# Patient Record
Sex: MALE | Race: WHITE | Employment: UNEMPLOYED | URBAN - METROPOLITAN AREA
[De-identification: names, ages, dates, MRNs, and addresses within clinical notes are randomized per-mention and may not be internally consistent; named-entity substitution may affect disease eponyms.]

---

## 2017-03-10 ENCOUNTER — OFFICE VISIT (OUTPATIENT)
Dept: CARDIOLOGY CLINIC | Age: 62
End: 2017-03-10

## 2017-03-10 VITALS
HEIGHT: 68 IN | HEART RATE: 115 BPM | WEIGHT: 169 LBS | SYSTOLIC BLOOD PRESSURE: 130 MMHG | DIASTOLIC BLOOD PRESSURE: 82 MMHG | BODY MASS INDEX: 25.61 KG/M2

## 2017-03-10 DIAGNOSIS — R42 ORTHOSTATIC DIZZINESS: Primary | ICD-10-CM

## 2017-03-10 DIAGNOSIS — I95.1 ORTHOSTATIC HYPOTENSION: ICD-10-CM

## 2017-03-10 DIAGNOSIS — F20.0 PARANOID SCHIZOPHRENIA (HCC): ICD-10-CM

## 2017-03-10 NOTE — PROGRESS NOTES
Chief Complaint   Patient presents with    Dizziness    Follow-up     1 month follow up     Verified patient with two types of identifiers.    Verified medications with patients medications list.

## 2017-03-10 NOTE — MR AVS SNAPSHOT
Visit Information Date & Time Provider Department Dept. Phone Encounter #  
 3/10/2017 11:40 AM Vanessa Vergara MD [facility] Tamika Thomas [phone] [id number] Follow-up Instructions Return in about 6 months (around 9/10/2017). Your Appointments 9/14/2017  2:00 PM  
ESTABLISHED PATIENT with Vanessa Vergara MD  
[facility] ([address]) Appt Note: 6 month follow up per Dr. Xochilt Morin [address]  
[address] [facility] 7 [address] Upcoming Health Maintenance Date Due Hepatitis C Screening 1955 DTaP/Tdap/Td series (1 - Tdap) 3/4/1975 FOBT Q 1 YEAR AGE 50-75 3/4/2004 ZOSTER VACCINE AGE 60> 3/4/2014 INFLUENZA AGE 9 TO ADULT 8/1/2016 Allergies as of 3/10/2017  Review Complete On: 3/10/2017 By: Vanessa Vergara MD  
 No Known Allergies Current Immunizations  Reviewed on 10/18/2016 No immunizations on file. Not reviewed this visit You Were Diagnosed With   
  
 Codes Comments Orthostatic dizziness    -  Primary ICD-10-CM: A14 ICD-9-CM: 780.4 Orthostatic hypotension     ICD-10-CM: I95.1 ICD-9-CM: 458.0 Paranoid schizophrenia ([facility] [address]     ICD-10-CM: F20.0 ICD-9-CM: 295.30 Vitals BP Pulse Height(growth percentile) Weight(growth percentile) BMI Smoking Status 130/82 (BP 1 Location: Right arm, BP Patient Position: Sitting) (!) 115 5' 8\" (1.727 m) 169 lb (76.7 kg) 25.7 kg/m2 Never Smoker Vitals History BMI and BSA Data Body Mass Index Body Surface Area 25.7 kg/m 2 1.92 m 2 Preferred Pharmacy Pharmacy Name Phone [address] [phone] Your Updated Medication List  
  
   
This list is accurate as of: 3/10/17 12:10 PM.  Always use your most recent med list.  
  
  
  
  
 clonazePAM 2 mg tablet Commonly known as:  Luisa Cos Take 2 mg by mouth nightly. cloZAPine 25 mg tablet Commonly known as:  CLOZARIL Take 75 mg by mouth daily. fludrocortisone 0.1 mg tablet Commonly known as:  FLORINEF Take 1 Tab by mouth daily. Indications: Symptomatic Orthostatic Hypotension  
  
 fluvoxaMINE 50 mg tablet Commonly known as:  Nandini Dubs Take 50 mg by mouth nightly.  
  
 gabapentin 100 mg capsule Commonly known as:  NEURONTIN Take 100 mg by mouth as directed. 2 po bid VOLTAREN PO Take 75 mg by mouth. Follow-up Instructions Return in about 6 months (around 9/10/2017). Patient Instructions Follow up with Dr. Gordon Gaston in 6 months. Introducing [facility]! Ashish Jacobson introduces Streamup patient portal. Now you can access parts of your medical record, email your doctor's office, and request medication refills online. 1. In your internet browser, go to [email or web address] 2. Click on the First Time User? Click Here link in the Sign In box. You will see the New Member Sign Up page. 3. Enter your Streamup Access Code exactly as it appears below. You will not need to use this code after youve completed the sign-up process. If you do not sign up before the expiration date, you must request a new code. · Streamup Access Code: [email or web address] Expires: 6/8/2017 12:09 PM 
 
4. Enter the last four digits of your Social Security Number (xxxx) and Date of Birth (mm/dd/yyyy) as indicated and click Submit. You will be taken to the next sign-up page. 5. Create a Streamup ID. This will be your Streamup login ID and cannot be changed, so think of one that is secure and easy to remember. 6. Create a Streamup password. You can change your password at any time. 7. Enter your Password Reset Question and Answer. This can be used at a later time if you forget your password. 8. Enter your e-mail address.  You will receive e-mail notification when new information is available in [employer]. 9. Click Sign Up. You can now view and download portions of your medical record. 10. Click the Download Summary menu link to download a portable copy of your medical information. If you have questions, please visit the Frequently Asked Questions section of the [employer] website. Remember, [employer] is NOT to be used for urgent needs. For medical emergencies, dial 911. Now available from your iPhone and Android! Please provide this summary of care documentation to your next provider. Your primary care clinician is listed as Marimar Buenrostro. If you have any questions after today's visit, please call [phone].

## 2017-03-10 NOTE — PROGRESS NOTES
Cardiac Electrophysiology Office  Note     Subjective:      Poonam Holder is a 60 y.o. male patient who is seen for evaluation of orthostatic hypotension/dizziness    He is kindly referred by Dr Noah Lan. Echo 10/12/16 LVEF 55 %. No RWMA. His episodes of dizziness in the past.  He got up to go the bathroom in the middle of the night, he felt lightheaded/dizzy and had to grab on the sink and went back to bed. He then woke up dizzy. No LOC. He has had episode dizziness a couple nights before when rolling over in bed. Since then he tried antivert and said he helped some  He has been on antipsychotic for 25 years and it helped his psychosis     He is here today for follow up, he was started on florinef last visit. Since then he has not had any further episodes of dizziness. No hospitalizations or ED visit. He denies SOB or chest pain. His reports his heart is beating fast because he is very nervous when he is at the doctors. He says he cannot drink w/ caffeine because he has PVCs/SOB. Social Hx: Denies tobacco use. 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 Single. Live alone. He does not drive. He is on disability. PMhx includes schizophrena    Patient Active Problem List    Diagnosis Date Noted    Orthostatic hypotension 10/18/2016    Orthostatic dizziness 10/07/2016    Arthralgia of both knees 07/28/2015    Dry eyes 01/27/2015    AR (allergic rhinitis) 01/27/2015    Finger numbness 09/02/2014    Constipation 11/22/2010    [facility] 11/22/2010     Current Outpatient Prescriptions   Medication Sig Dispense Refill    cloZAPine (CLOZARIL) 25 mg tablet Take 75 mg by mouth daily.  fludrocortisone (FLORINEF) 0.1 mg tablet Take 1 Tab by mouth daily. Indications: Symptomatic Orthostatic Hypotension 30 Tab 12    DICLOFENAC SODIUM (VOLTAREN PO) Take 75 mg by mouth.  fluvoxaMINE (LUVOX) 50 mg tablet Take 50 mg by mouth nightly.  clonazepam (KLONOPIN) 2 mg tablet Take 2 mg by mouth nightly.  gabapentin (NEURONTIN) 100 mg capsule Take 100 mg by mouth as directed. 2 po bid        No Known Allergies  Past Medical History:   Diagnosis Date    Psychotic disorder      No past surgical history    Family History   Problem Relation Age of Onset    No Known Problems Mother     No Known Problems Father      Social History   Substance Use Topics    Smoking status: Never Smoker    Smokeless tobacco: Never Used    Alcohol use No        Review of Systems:   Constitutional: Negative for fever, chills, weight loss, malaise/fatigue. HEENT: Negative for nosebleeds, vision changes. Respiratory: Negative for cough, hemoptysis, sputum production, and wheezing. Cardiovascular: Negative for chest pain, palpitations, orthopnea, claudication, leg swelling, syncope, and PND. Gastrointestinal: Negative for nausea, vomiting, diarrhea, constipation, blood in stool and melena. Genitourinary: Negative for dysuria, and hematuria. Musculoskeletal: Negative for myalgias, arthralgia. Skin: Negative for rash. Heme: Does not bleed or bruise easily. Neurological: Negative for speech change and focal weakness     Objective:     Visit Vitals    /82 (BP 1 Location: Right arm, BP Patient Position: Sitting)    Pulse (!) 115    Ht 5' 8\" (1.727 m)    Wt 169 lb (76.7 kg)    BMI 25.7 kg/m2        Physical Exam:   Constitutional: Well-nourished. No distress. Head: Normocephalic and atraumatic. Eyes: Pupils are equal, round  Neck: supple. No JVD present. Cardiovascular: tachycardic rate, regular rhythm. Exam reveals no gallop and no friction rub. No murmur heard. Pulmonary/Chest: Effort normal and breath sounds normal. No wheezes. Abdominal: Soft, no tenderness. Musculoskeletal: no edema. Neurological: alert,oriented. Skin: Skin is warm and dry  Psychiatric: normal mood and affect.  Behavior is normal. Judgment and thought content normal.      EKG, old: normal sinus rhythm normal intervals    Assessment/Plan:       ICD-10-CM ICD-9-CM    1. Orthostatic dizziness R42 780.4    2. Orthostatic hypotension I95.1 458.0    3. Paranoid schizophrenia ([facility] [address] F20.0 295.30        BP stable with florinef, no further episodes of dizziness. Encouraged him to continue drinking water throughout the day. HR elevated in the office reported d/t anxiety, will continue to monitor. Follow-up Disposition:  Return in about 6 months (around 9/10/2017). to see if better and if more testing or treatment needed      Thank you for involving me in this patient's care and please call with further concerns or questions. Bronwyn Peralta M.D.   Electrophysiology/Cardiology  [facility] [place]  [address], [facility] [address] [place] [address]  [phone]

## 2017-03-10 NOTE — PROGRESS NOTES
Cardiac Electrophysiology Office Note     Subjective:      Misty Dotson is a 60 y.o. male patient who is seen for evaluation of orthostatic hypotension/dizziness    He is kindly referred by Dr Sherri Newman. Echo 10/12/16 LVEF 55 %. No RWMA. His episodes of dizziness in the past.  He got up to go the bathroom in the middle of the night, he felt lightheaded/dizzy and had to grab on the sink and went back to bed. He then woke up dizzy. No LOC. He has had episode dizziness a couple nights before when rolling over in bed. Since then he tried antivert and said he helped some  He has been on antipsychotic for 25 years and it helped his psychosis      He is here today for follow up, he was started on florinef last visit. Since then he has not had any further episodes of dizziness. No hospitalizations or ED visit. He denies SOB or chest pain. His reports his heart is beating fast because he is very nervous when he is at the doctor's. He says he cannot drink w/ caffeine because he has PVCs/SOB. Social Hx: Denies tobacco use. 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 Single. Live alone. He does not drive. He is on disability. PMhx includes schizophrena         Patient Active Problem List    Diagnosis Date Noted    Orthostatic hypotension 10/18/2016    Orthostatic dizziness 10/07/2016    Arthralgia of both knees 07/28/2015    Dry eyes 01/27/2015    AR (allergic rhinitis) 01/27/2015    Finger numbness 09/02/2014    Constipation 11/22/2010    [facility] 11/22/2010     Current Outpatient Prescriptions   Medication Sig Dispense Refill    cloZAPine (CLOZARIL) 25 mg tablet Take 75 mg by mouth daily.  fludrocortisone (FLORINEF) 0.1 mg tablet Take 1 Tab by mouth daily. Indications: Symptomatic Orthostatic Hypotension 30 Tab 12    DICLOFENAC SODIUM (VOLTAREN PO) Take 75 mg by mouth.  fluvoxaMINE (LUVOX) 50 mg tablet Take 50 mg by mouth nightly.       clonazepam (KLONOPIN) 2 mg tablet Take 2 mg by mouth nightly.  gabapentin (NEURONTIN) 100 mg capsule Take 100 mg by mouth as directed. 2 po bid        No Known Allergies  Past Medical History:   Diagnosis Date    Psychotic disorder      No past surgical history    Family History   Problem Relation Age of Onset    No Known Problems Mother     No Known Problems Father      Social History   Substance Use Topics    Smoking status: Never Smoker    Smokeless tobacco: Never Used    Alcohol use No        Review of Systems:   Constitutional: Negative for fever, chills, weight loss, malaise/fatigue. HEENT: Negative for nosebleeds, vision changes. Respiratory: Negative for cough, hemoptysis, sputum production, and wheezing. Cardiovascular: Negative for chest pain, palpitations, orthopnea, claudication, leg swelling, syncope, and PND. Gastrointestinal: Negative for nausea, vomiting, diarrhea, constipation, blood in stool and melena. Genitourinary: Negative for dysuria, and hematuria. Musculoskeletal: Negative for myalgias, arthralgia. Skin: Negative for rash. Heme: Does not bleed or bruise easily. Neurological: Negative for speech change and focal weakness     Objective:     Visit Vitals    /82 (BP 1 Location: Right arm, BP Patient Position: Sitting)    Pulse (!) 115    Ht 5' 8\" (1.727 m)    Wt 169 lb (76.7 kg)    BMI 25.7 kg/m2        Physical Exam:   Constitutional: Well-nourished. No distress. Head: Normocephalic and atraumatic. Eyes: Pupils are equal, round  Neck: supple. No JVD present. Cardiovascular: tachycardic rate, regular rhythm. Exam reveals no gallop and no friction rub. No murmur heard. Pulmonary/Chest: Effort normal and breath sounds normal. No wheezes. Abdominal: Soft, no tenderness. Musculoskeletal: no edema. Neurological: alert,oriented. Skin: Skin is warm and dry  Psychiatric: normal mood and affect.  Behavior is normal. Judgment and thought content normal.           Assessment/Plan:       ICD-10-CM ICD-9-CM    1. Orthostatic dizziness R42 780.4    2. Orthostatic hypotension I95.1 458.0    3. Paranoid schizophrenia ([facility] [address] F20.0 295.30        BP is better with florinef, no further episodes of dizziness since it was started. Encouraged him to continue drinking water  64 oz a day  May have salty foods  Sinus tachycardia in the office due to anxiety   Continue with florinef and call me if he has recurrent problem or sustained sinus tach    Follow-up Disposition:  Return in about 6 months (around 9/10/2017). Thank you for involving me in this patient's care and please call with further concerns or questions. Joana Celis M.D.   Electrophysiology/Cardiology  [facility] [place]  [facility], [address]  [phone]

## 2017-05-01 ENCOUNTER — OFFICE VISIT (OUTPATIENT)
Dept: INTERNAL MEDICINE CLINIC | Age: 62
End: 2017-05-01

## 2017-05-01 VITALS
HEART RATE: 114 BPM | TEMPERATURE: 98.2 F | RESPIRATION RATE: 22 BRPM | OXYGEN SATURATION: 95 % | DIASTOLIC BLOOD PRESSURE: 79 MMHG | HEIGHT: 68 IN | WEIGHT: 167 LBS | SYSTOLIC BLOOD PRESSURE: 110 MMHG | BODY MASS INDEX: 25.31 KG/M2

## 2017-05-01 DIAGNOSIS — M25.562 ARTHRALGIA OF BOTH KNEES: ICD-10-CM

## 2017-05-01 DIAGNOSIS — I95.1 ORTHOSTATIC HYPOTENSION: Primary | ICD-10-CM

## 2017-05-01 DIAGNOSIS — M25.561 ARTHRALGIA OF BOTH KNEES: ICD-10-CM

## 2017-05-01 DIAGNOSIS — F20.0 PARANOID SCHIZOPHRENIA (HCC): ICD-10-CM

## 2017-05-01 NOTE — MR AVS SNAPSHOT
Visit Information Date & Time Provider Department Dept. Phone Encounter #  
 5/1/2017  1:30 PM Louise Viveros [facility] Internal Medicine [phone] [id number] Follow-up Instructions Return in about 6 months (around 11/1/2017). Your Appointments 9/14/2017  2:00 PM  
ESTABLISHED PATIENT with Ryan Shelby MD  
[facility] ([facility]) Appt Note: 6 month follow up per Dr. Barker Sizer [address]  
[address] [facility] [address] Upcoming Health Maintenance Date Due Hepatitis C Screening 1955 DTaP/Tdap/Td series (1 - Tdap) 3/4/1975 FOBT Q 1 YEAR AGE 50-75 3/4/2004 ZOSTER VACCINE AGE 60> 3/4/2014 INFLUENZA AGE 9 TO ADULT 8/1/2017 Allergies as of 5/1/2017  Review Complete On: 5/1/2017 By: Queenie Us, DO No Known Allergies Current Immunizations  Reviewed on 10/18/2016 No immunizations on file. Not reviewed this visit You Were Diagnosed With   
  
 Codes Comments Orthostatic hypotension    -  Primary ICD-10-CM: I95.1 ICD-9-CM: 458.0 Arthralgia of both knees     ICD-10-CM: M25.561, M25.562 ICD-9-CM: 719.46 [facility]an[facility] ([facility]     ICD-10-CM: F20.0 ICD-9-CM: 295.30 Vitals BP Pulse Temp Resp Height(growth percentile) Weight(growth percentile) 110/79 (!) 114 98.2 °F (36.8 °C) (Oral) 22 5' 8\" (1.727 m) 167 lb (75.8 kg) SpO2 BMI Smoking Status 95% 25.39 kg/m2 Never Smoker BMI and BSA Data Body Mass Index Body Surface Area  
 25.39 kg/m 2 1.91 m 2 Preferred Pharmacy Pharmacy Name Phone [address] [phone] Your Updated Medication List  
  
   
This list is accurate as of: 5/1/17  1:59 PM.  Always use your most recent med list.  
  
  
  
  
 clonazePAM 2 mg tablet Commonly known as:  Shannan Willisaham Take 2 mg by mouth nightly. cloZAPine 25 mg tablet Commonly known as:  CLOZARIL Take 75 mg by mouth daily. fludrocortisone 0.1 mg tablet Commonly known as:  FLORINEF Take 1 Tab by mouth daily. Indications: Symptomatic Orthostatic Hypotension  
  
 fluvoxaMINE 50 mg tablet Commonly known as:  Genet Potter Take 50 mg by mouth nightly.  
  
 gabapentin 100 mg capsule Commonly known as:  NEURONTIN Take 100 mg by mouth as directed. 2 po bid VOLTAREN PO Take 75 mg by mouth. Follow-up Instructions Return in about 6 months (around 11/1/2017). Please provide this summary of care documentation to your next provider. Your primary care clinician is listed as Phoebe Dalal. If you have any questions after today's visit, please call [phone].

## 2017-05-01 NOTE — PROGRESS NOTES
Chief Complaint   Patient presents with    Neck Pain     pain scale 4/10      Reviewed record  In preparation for visit and have obtained necessary documentation. 1. Have you been to the ER, urgent care clinic since your last visit? Hospitalized since your last visit? No    2. Have you seen or consulted any other health care providers outside of the [address] [place] [address] since your last visit? Include any pap smears or colon screening.  No      Used 2 patient I. D. 's.

## 2017-05-23 NOTE — PROGRESS NOTES
HISTORY OF PRESENT ILLNESS  Naz Yip is a 60 y.o. male. Pt. comes in after a few months for f/u. Has multiple medical problems. Reports a few weeks of neck pain. No trauma. Has had similar issues in past. No focal neurological issues. Also has chronic right shoulder and bilat knee pain. Followed by psychiatrist for schizophrenia. Has anxiety. His hypotension has been stable. Reports compliance with medications and diet. Med list and most recent labs/studies reviewed with pt. Trying to be active physically to control weight. Reports no other new c/o. Neck Pain   Associated symptoms include headaches. Pertinent negatives include no chest pain, no abdominal pain and no shortness of breath. Follow Up Chronic Condition   Associated symptoms include headaches. Pertinent negatives include no chest pain, no abdominal pain and no shortness of breath. Hypotension   Associated symptoms include headaches. Pertinent negatives include no chest pain, no abdominal pain and no shortness of breath. Review of Systems   Constitutional: Negative. Eyes: Negative for blurred vision. Respiratory: Negative for shortness of breath. Cardiovascular: Negative for chest pain and leg swelling. Gastrointestinal: Negative for abdominal pain. Genitourinary: Negative for dysuria. Musculoskeletal: Positive for joint pain and neck pain. Negative for back pain and falls. Skin: Negative for rash. Neurological: Positive for headaches. Negative for dizziness, sensory change and focal weakness. Psychiatric/Behavioral: Positive for depression and hallucinations. The patient is not nervous/anxious and does not have insomnia. All other systems reviewed and are negative. Physical Exam   Constitutional: He is oriented to person, place, and time. He appears well-developed and well-nourished. No distress. HENT:   Head: Normocephalic and atraumatic.    Mouth/Throat: Oropharynx is clear and moist.   Eyes: Conjunctivae are normal. No scleral icterus. Neck: Normal range of motion. Neck supple. No JVD present. No thyromegaly present. Cardiovascular: Normal rate, regular rhythm, normal heart sounds and intact distal pulses. No murmur heard. Pulmonary/Chest: Effort normal and breath sounds normal. No respiratory distress. He has no wheezes. He has no rales. Abdominal: Soft. Bowel sounds are normal. He exhibits no distension. Musculoskeletal: He exhibits tenderness (R cervicals/ trapezius muscle). He exhibits no edema. Neurological: He is alert and oriented to person, place, and time. He has normal reflexes. No cranial nerve deficit. Coordination normal.   Skin: Skin is warm and dry. No rash noted. Psychiatric: He has a normal mood and affect. His behavior is normal.   Nursing note and vitals reviewed. ASSESSMENT and PLAN  O[place] Signs was seen today for neck pain, follow up chronic condition and hypotension. Diagnoses and all orders for this visit:    Neck pain    Orthostatic hypotension    Arthralgia of both knees    Paranoid schizophrenia ([facility] [address]      Follow-up Disposition:  Return in about 6 months (around 11/1/2017).    lab results and schedule of future lab studies reviewed with patient  reviewed diet, exercise and weight control  reviewed medications and side effects in detail  Apply heat to area  ROM exercoses  Reassurance  F/u with other MD's as scheduled

## 2017-09-14 ENCOUNTER — OFFICE VISIT (OUTPATIENT)
Dept: CARDIOLOGY CLINIC | Age: 62
End: 2017-09-14

## 2017-09-14 VITALS
SYSTOLIC BLOOD PRESSURE: 120 MMHG | WEIGHT: 167.2 LBS | HEIGHT: 68 IN | HEART RATE: 96 BPM | BODY MASS INDEX: 25.34 KG/M2 | OXYGEN SATURATION: 99 % | DIASTOLIC BLOOD PRESSURE: 82 MMHG

## 2017-09-14 DIAGNOSIS — F20.0 PARANOID SCHIZOPHRENIA (HCC): ICD-10-CM

## 2017-09-14 DIAGNOSIS — I95.1 ORTHOSTATIC HYPOTENSION: ICD-10-CM

## 2017-09-14 DIAGNOSIS — R42 ORTHOSTATIC DIZZINESS: Primary | ICD-10-CM

## 2017-09-14 NOTE — PROGRESS NOTES
Cardiac Electrophysiology Office Note     Subjective:      Jose Paredes is a 60 y.o. male patient who is seen for evaluation of orthostatic hypotension/dizziness    He is kindly referred by Dr Sabine Huang. Echo 10/12/16 LVEF 55 %. No RWMA. He has not had syncope or dizziness but twice has accidentally hit his head but no bleeding     In the past:  He got up to go the bathroom in the middle of the night, he felt lightheaded/dizzy and had to grab on the sink and went back to bed. He then woke up dizzy. No LOC. He has had episode dizziness a couple nights before when rolling over in bed. Since then he tried antivert and said he helped some  He has been on antipsychotic for 25 years and it helped his psychosis     Social Hx: Denies tobacco use. 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 Single. Live alone. He does not drive. He is on disability. PMhx includes schizophrena         Patient Active Problem List    Diagnosis Date Noted    Orthostatic hypotension 10/18/2016    Orthostatic dizziness 10/07/2016    Arthralgia of both knees 07/28/2015    Dry eyes 01/27/2015    AR (allergic rhinitis) 01/27/2015    Finger numbness 09/02/2014    Constipation 11/22/2010    Schizophrenia ([facility] [address] [facility] 11/22/2010     Current Outpatient Prescriptions   Medication Sig Dispense Refill    cloZAPine (CLOZARIL) 25 mg tablet Take 75 mg by mouth daily.  fludrocortisone (FLORINEF) 0.1 mg tablet Take 1 Tab by mouth daily. Indications: Symptomatic Orthostatic Hypotension 30 Tab 12    DICLOFENAC SODIUM (VOLTAREN PO) Take 75 mg by mouth.  fluvoxaMINE (LUVOX) 50 mg tablet Take 50 mg by mouth nightly.  clonazepam (KLONOPIN) 2 mg tablet Take 2 mg by mouth nightly.  gabapentin (NEURONTIN) 100 mg capsule Take 100 mg by mouth as directed.  2 po bid        No Known Allergies  Past Medical History:   Diagnosis Date    Psychotic disorder      No past surgical history    Family History   Problem Relation Age of Onset    No Known Problems Mother  No Known Problems Father      Social History   Substance Use Topics    Smoking status: Never Smoker    Smokeless tobacco: Never Used    Alcohol use No        Review of Systems:   Constitutional: Negative for fever, chills, weight loss, malaise/fatigue. HEENT: Negative for nosebleeds, vision changes. Respiratory: Negative for cough, hemoptysis, sputum production, and wheezing. Cardiovascular: Negative for chest pain, palpitations, orthopnea, claudication, leg swelling, syncope, and PND. Gastrointestinal: Negative for nausea, vomiting, diarrhea, constipation, blood in stool and melena. Genitourinary: Negative for dysuria, and hematuria. Musculoskeletal: Negative for myalgias, arthralgia. Skin: Negative for rash. Heme: Does not bleed or bruise easily. Neurological: Negative for speech change and focal weakness     Objective:     Visit Vitals    /82 (BP 1 Location: Left arm, BP Patient Position: Sitting)    Pulse 96    Ht 5' 8\" (1.727 m)    Wt 167 lb 3.2 oz (75.8 kg)    SpO2 99%    BMI 25.42 kg/m2        Physical Exam:   Constitutional: Well-nourished. No distress. Head: Normocephalic and atraumatic. Eyes: Pupils are equal, round  Neck: supple. No JVD present. Cardiovascular: normal rate, regular rhythm. Exam reveals no gallop and no friction rub. No murmur heard. Pulmonary/Chest: Effort normal and breath sounds normal. No wheezes. Abdominal: Soft, no tenderness. Musculoskeletal: no edema. Neurological: alert,oriented. Skin: Skin is warm and dry  Psychiatric: normal mood and affect. Behavior is normal. Judgment and thought content normal.           Assessment/Plan:       ICD-10-CM ICD-9-CM    1. Orthostatic dizziness R42 780.4    2. Orthostatic hypotension I95.1 458.0    3.  Paran[facility] ([facility] F20.0 295.30      BP is better with florinef, no further episodes of dizziness or syncope  Encouraged him to continue drinking water 64 oz a day  Continue with florinef and call me if he has recurrent problem or sustained sinus tach    Follow-up Disposition:  Return in about 1 year (around 9/14/2018). Thank you for involving me in this patient's care and please call with further concerns or questions. Moraima Thurman M.D.   Electrophysiology/Cardiology  [facility] [place]  [facility] [address]  [phone]

## 2017-09-14 NOTE — MR AVS SNAPSHOT
Visit Information Date & Time Provider Department Dept. Phone Encounter #  
 9/14/2017  2:00 PM Dinorah Chaudhry MD [facility] [place] [phone] [id number] Follow-up Instructions Return in about 1 year (around 9/14/2018). Follow-up and Disposition History Your Appointments 10/30/2017  2:30 PM  
[facility] Appt Note: 6 month follow up [address] [place]  
[phone]  
  
   
 [address]  
  
    
 9/13/2018  1:00 PM  
ESTABLISHED PATIENT with Dinorah Chaudhry MD  
[facility] ([facility]) Appt Note: 1 year fu  
 [address] [place] [address]  
[address] [facility] [address] Upcoming Health Maintenance Date Due Hepatitis C Screening 1955 DTaP/Tdap/Td series (1 - Tdap) 3/4/1975 FOBT Q 1 YEAR AGE 50-75 3/4/2004 ZOSTER VACCINE AGE 60> 1/4/2014 INFLUENZA AGE 9 TO ADULT 8/1/2017 Allergies as of 9/14/2017  Review Complete On: 9/14/2017 By: Dinorah Chaudhry MD  
 No Known Allergies Current Immunizations  Reviewed on 10/18/2016 No immunizations on file. Not reviewed this visit You Were Diagnosed With   
  
 Codes Comments Orthostatic dizziness    -  Primary ICD-10-CM: K93 ICD-9-CM: 780.4 Orthostatic hypotension     ICD-10-CM: I95.1 ICD-9-CM: 458.0 Paranoid schizophrenia ([facility] [address]     ICD-10-CM: F20.0 ICD-9-CM: 295.30 Vitals BP Pulse Height(growth percentile) Weight(growth percentile) SpO2 BMI  
 120/82 (BP 1 Location: Left arm, BP Patient Position: Sitting) 96 5' 8\" (1.727 m) 167 lb 3.2 oz (75.8 kg) 99% 25.42 kg/m2 Smoking Status Never Smoker Vitals History BMI and BSA Data  Body Mass Index Body Surface Area  
 25.42 kg/m 2 1.91 m 2  
 Preferred Pharmacy Pharmacy Name Phone [address] [address] [phone] Your Updated Medication List  
  
   
This list is accurate as of: 9/14/17  3:37 PM.  Always use your most recent med list.  
  
  
  
  
 clonazePAM 2 mg tablet Commonly known as:  Ashuelot Furnish Take 2 mg by mouth nightly. cloZAPine 25 mg tablet Commonly known as:  CLOZARIL Take 75 mg by mouth daily. fludrocortisone 0.1 mg tablet Commonly known as:  FLORINEF Take 1 Tab by mouth daily. Indications: Symptomatic Orthostatic Hypotension  
  
 fluvoxaMINE 50 mg tablet Commonly known as:  Ebb Client Take 50 mg by mouth nightly.  
  
 gabapentin 100 mg capsule Commonly known as:  NEURONTIN Take 100 mg by mouth as directed. 2 po bid VOLTAREN PO Take 75 mg by mouth. Follow-up Instructions Return in about 1 year (around 9/14/2018). Patient Instructions You will need to follow up in clinic with Dr. Charmayne Angst in 1 year. Please provide this summary of care documentation to your next provider. Your primary care clinician is listed as Itz Mitchell. If you have any questions after today's visit, please call [phone].

## 2017-09-14 NOTE — PROGRESS NOTES
Cardiac Electrophysiology Office Note     Subjective:      Manuela Hawley is a 60 y.o. male patient who is seen for evaluation of orthostatic hypotension/dizziness    He is kindly referred by Dr Olivia Short. Echo 10/12/16 LVEF 55 %. No RWMA. His episodes of dizziness in the past.  He is here today for follow up. He reports he has been well. He mentions he has hit his head a couple times, not associated with falling or fainting. No further syncope or near syncope since he was last seem. No lightheadedness or dizziness upon standing. He is taking the florinef as Rx. No hospitalizations or ED visit. Past hx  He got up to go the bathroom in the middle of the night, he felt lightheaded/dizzy and had to grab on the sink and went back to bed. He then woke up dizzy. No LOC. He has had episode dizziness a couple nights before when rolling over in bed. Since then he tried antivert and said he helped some  He has been on antipsychotic for 25 years and it helped his psychosis. He some feels his heart is beating fast because he is very nervous when he is at the doctor's. He says he cannot drink w/ caffeine because he has PVCs/SOB. Social Hx: Denies tobacco use. 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 Single. Live alone. He does not drive. He is on disability. PMhx includes schizophrena         Patient Active Problem List    Diagnosis Date Noted    Orthostatic hypotension 10/18/2016    Orthostatic dizziness 10/07/2016    Arthralgia of both knees 07/28/2015    Dry eyes 01/27/2015    AR (allergic rhinitis) 01/27/2015    Finger numbness 09/02/2014    Constipation 11/22/2010    Schizophrenia ([facility] [address] 11/22/2010     Current Outpatient Prescriptions   Medication Sig Dispense Refill    cloZAPine (CLOZARIL) 25 mg tablet Take 75 mg by mouth daily.  fludrocortisone (FLORINEF) 0.1 mg tablet Take 1 Tab by mouth daily.  Indications: Symptomatic Orthostatic Hypotension 30 Tab 12    DICLOFENAC SODIUM (VOLTAREN PO) Take 75 mg by mouth.      fluvoxaMINE (LUVOX) 50 mg tablet Take 50 mg by mouth nightly.  clonazepam (KLONOPIN) 2 mg tablet Take 2 mg by mouth nightly.  gabapentin (NEURONTIN) 100 mg capsule Take 100 mg by mouth as directed. 2 po bid        No Known Allergies  Past Medical History:   Diagnosis Date    Psychotic disorder      No past surgical history    Family History   Problem Relation Age of Onset    No Known Problems Mother     No Known Problems Father      Social History   Substance Use Topics    Smoking status: Never Smoker    Smokeless tobacco: Never Used    Alcohol use No        Review of Systems:   Constitutional: Negative for fever, chills, weight loss, malaise/fatigue. HEENT: Negative for nosebleeds, vision changes. Respiratory: Negative for cough, hemoptysis, sputum production, and wheezing. Cardiovascular: Negative for chest pain, palpitations, orthopnea, claudication, leg swelling, syncope, and PND. Gastrointestinal: Negative for nausea, vomiting, diarrhea, constipation, blood in stool and melena. Genitourinary: Negative for dysuria, and hematuria. Musculoskeletal: Negative for myalgias, arthralgia. Skin: Negative for rash. Heme: Does not bleed or bruise easily. Neurological: Negative for speech change and focal weakness     Objective:     Visit Vitals    /82 (BP 1 Location: Left arm, BP Patient Position: Sitting)    Pulse 96    Ht 5' 8\" (1.727 m)    Wt 167 lb 3.2 oz (75.8 kg)    SpO2 99%    BMI 25.42 kg/m2        Physical Exam:   Constitutional: Well-nourished. No distress. Head: Normocephalic and atraumatic. Eyes: Pupils are equal, round  Neck: supple. No JVD present. Cardiovascular: regular rate, regular rhythm. Exam reveals no gallop and no friction rub. No murmur heard. Pulmonary/Chest: Effort normal and breath sounds normal. No wheezes. Abdominal: Soft, no tenderness. Musculoskeletal: no edema. Neurological: alert,oriented.    Skin: Skin is warm and dry  Psychiatric: normal mood and affect. Behavior is normal. Judgment and thought content normal.           Assessment/Plan:       ICD-10-CM ICD-9-CM    1. Orthostatic dizziness R42 780.4    2. Orthostatic hypotension I95.1 458.0    3. Paranoid schizophrenia ([facility] [address] [facility] F20.0 295.30        BP is better with florinef, no further episodes of dizziness or syncope since it was started. Continue to drink 64 oz of water and salty foods. Continue with florinef and call me if he has recurrent problem or sustained sinus tach    Follow up in 1 year    Follow-up Disposition:  Return in about 1 year (around 9/14/2018). Thank you for involving me in this patient's care and please call with further concerns or questions. Jhonatan Gutierrez M.D.   Electrophysiology/Cardiology  [facility] [place]  [address] [place] [address]  [phone]

## 2017-09-28 RX ORDER — FLUDROCORTISONE ACETATE 0.1 MG/1
0.1 TABLET ORAL DAILY
Qty: 30 TAB | Refills: 12 | Status: SHIPPED | OUTPATIENT
Start: 2017-09-28 | End: 2018-01-01 | Stop reason: SDUPTHER

## 2017-09-28 NOTE — TELEPHONE ENCOUNTER
Request for Florinef 0.1mg daily. Last office visit 9/14/17, next office visit 9/13/18. Refills per verbal order from Dr. Marsha Guerrero.

## 2017-10-30 ENCOUNTER — OFFICE VISIT (OUTPATIENT)
Dept: INTERNAL MEDICINE CLINIC | Age: 62
End: 2017-10-30

## 2017-10-30 VITALS
RESPIRATION RATE: 19 BRPM | HEART RATE: 98 BPM | BODY MASS INDEX: 25.01 KG/M2 | DIASTOLIC BLOOD PRESSURE: 89 MMHG | HEIGHT: 68 IN | WEIGHT: 165 LBS | SYSTOLIC BLOOD PRESSURE: 148 MMHG | OXYGEN SATURATION: 94 %

## 2017-10-30 DIAGNOSIS — J30.89 CHRONIC NON-SEASONAL ALLERGIC RHINITIS, UNSPECIFIED TRIGGER: ICD-10-CM

## 2017-10-30 DIAGNOSIS — F20.0 PARANOID SCHIZOPHRENIA (HCC): ICD-10-CM

## 2017-10-30 DIAGNOSIS — R23.8 SKIN IRRITATION: ICD-10-CM

## 2017-10-30 DIAGNOSIS — I95.1 ORTHOSTATIC HYPOTENSION: Primary | ICD-10-CM

## 2017-10-30 NOTE — PROGRESS NOTES
Health Maintenance Due   Topic Date Due    Hepatitis C Screening  1955    DTaP/Tdap/Td series (1 - Tdap) 03/04/1975    FOBT Q 1 YEAR AGE 50-75  03/04/2004    ZOSTER VACCINE AGE 60>  01/04/2014    INFLUENZA AGE 9 TO ADULT  08/01/2017       Chief Complaint   Patient presents with    Allergic Rhinitis    Hypotension    Mental Health Problem       1. Have you been to the ER, urgent care clinic since your last visit? Hospitalized since your last visit? No    2. Have you seen or consulted any other health care providers outside of the [address] [place] [address] since your last visit? Include any pap smears or colon screening. No    3) Do you have an Advance Directive on file? no    4) Are you interested in receiving information on Advance Directives? NO      Patient is accompanied by Self I have received verbal consent from Montse Crowder to discuss any/all medical information while they are present in the room.

## 2017-10-30 NOTE — MR AVS SNAPSHOT
Visit Information Date & Time Provider Department Dept. Phone Encounter #  
 10/30/2017  2:30 PM Diana Fitch [facility] Internal Medicine [phone] [id number] Follow-up Instructions Return in about 6 months (around 4/30/2018). Your Appointments 9/13/2018  1:00 PM  
ESTABLISHED PATIENT with Patric Baumgarten, MD  
[facility] ([facility]) Appt Note: 1 year fu  
 [address] [place] [address]  
[address] [address] Upcoming Health Maintenance Date Due Hepatitis C Screening 1955 DTaP/Tdap/Td series (1 - Tdap) 3/4/1975 FOBT Q 1 YEAR AGE 50-75 3/4/2004 ZOSTER VACCINE AGE 60> 1/4/2014 INFLUENZA AGE 9 TO ADULT 8/1/2017 Allergies as of 10/30/2017  Review Complete On: 10/30/2017 By: Padmini Burnham, DO No Known Allergies Current Immunizations  Reviewed on 10/18/2016 No immunizations on file. Not reviewed this visit You Were Diagnosed With   
  
 Codes Comments Orthostatic hypotension    -  Primary ICD-10-CM: I95.1 ICD-9-CM: 458.0 Skin irritation     ICD-10-CM: R23.8 ICD-9-CM: 709.9 Paranoid schizophrenia ([facility]     ICD-10-CM: F20.0 ICD-9-CM: 295.30 Chronic non-seasonal allergic rhinitis, unspecified trigger     ICD-10-CM: J30.89 ICD-9-CM: 477.9 Vitals BP Pulse Resp Height(growth percentile) Weight(growth percentile) SpO2  
 148/89 (BP 1 Location: Right arm, BP Patient Position: Sitting) 98 19 5' 8\" (1.727 m) 165 lb (74.8 kg) 94% BMI Smoking Status 25.09 kg/m2 Never Smoker Vitals History BMI and BSA Data Body Mass Index Body Surface Area 25.09 kg/m 2 1.89 m 2 Preferred Pharmacy Pharmacy Name Phone [facility] [address], [place] [phone] [facility] [phone] Your Updated Medication List  
  
   
 This list is accurate as of: 10/30/17  2:54 PM.  Always use your most recent med list.  
  
  
  
  
 clonazePAM 2 mg tablet Commonly known as:  Antoni Wallace Take 2 mg by mouth nightly. cloZAPine 25 mg tablet Commonly known as:  CLOZARIL Take 75 mg by mouth daily. fludrocortisone 0.1 mg tablet Commonly known as:  FLORINEF Take 1 Tab by mouth daily. Indications: Symptomatic Orthostatic Hypotension  
  
 fluvoxaMINE 50 mg tablet Commonly known as:  Trinh Shuck Take 50 mg by mouth nightly.  
  
 gabapentin 100 mg capsule Commonly known as:  NEURONTIN Take 100 mg by mouth as directed. 2 po bid VOLTAREN PO Take 75 mg by mouth. Follow-up Instructions Return in about 6 months (around 4/30/2018). Please provide this summary of care documentation to your next provider. Your primary care clinician is listed as Hiral Benitez. If you have any questions after today's visit, please call [phone].

## 2017-10-30 NOTE — PROGRESS NOTES
HISTORY OF PRESENT ILLNESS  Peterson Basurto is a 60 y.o. male. Pt. comes in for f/u. Has multiple medical problems. Reports chronic occasional popping/cricking sound in his neck but no pain. No trauma. No focal neurological issues. Has chronic allergy issues. OTC meds help. Reports to sleeping on his side and irritation of the inner aspects of his knees because of friction. Followed by psychiatrist for schizophrenia. Medications are helping. His orthostatic hypotension has been stable on Florinef. Will by cardiologist.  Reports compliance with medications and diet. Med list and most recent labs/studies reviewed with pt. Trying to be active physically to control weight. Denies tobacco or alcohol use. Lives by himself. Reports no other new c/o. Allergic Rhinitis   Pertinent negatives include no chest pain, no abdominal pain, no headaches and no shortness of breath. Hypotension   Pertinent negatives include no chest pain, no abdominal pain, no headaches and no shortness of breath. Mental Health Problem   Pertinent negatives include no chest pain, no abdominal pain, no headaches and no shortness of breath. Skin Problem   Pertinent negatives include no chest pain, no abdominal pain, no headaches and no shortness of breath. Review of Systems   Constitutional: Negative. HENT: Negative. Eyes: Negative for blurred vision. Respiratory: Negative for shortness of breath. Cardiovascular: Negative for chest pain and leg swelling. Gastrointestinal: Negative for abdominal pain. Genitourinary: Negative for dysuria. Musculoskeletal: Negative for back pain, falls, joint pain and neck pain. Skin: Negative for itching and rash. Neurological: Negative for dizziness, sensory change, focal weakness and headaches. Psychiatric/Behavioral: Positive for depression and hallucinations. The patient is not nervous/anxious and does not have insomnia.     All other systems reviewed and are negative. Physical Exam   Constitutional: He is oriented to person, place, and time. He appears well-developed and well-nourished. No distress. HENT:   Head: Normocephalic and atraumatic. Mouth/Throat: Oropharynx is clear and moist.   Eyes: Conjunctivae are normal.   Neck: Normal range of motion. Neck supple. No JVD present. No thyromegaly present. Cardiovascular: Normal rate, regular rhythm, normal heart sounds and intact distal pulses. No murmur heard. Pulmonary/Chest: Effort normal and breath sounds normal. No respiratory distress. He has no wheezes. He has no rales. Abdominal: Soft. Bowel sounds are normal. He exhibits no distension. Musculoskeletal: He exhibits no edema or tenderness. Neurological: He is alert and oriented to person, place, and time. He has normal reflexes. No cranial nerve deficit. Coordination normal.   Skin: Skin is warm and dry. No rash noted. There is erythema (Bilateral medial knees from contact/pressure, no infection). Psychiatric: He has a normal mood and affect. His behavior is normal.   Nursing note and vitals reviewed. ASSESSMENT and PLAN  Diagnoses and all orders for this visit:    1. Orthostatic hypotension    2. Skin irritation    3. Paranoid schizophrenia ([address]    4. Chronic non-seasonal allergic rhinitis, unspecified trigger      Follow-up Disposition:  Return in about 6 months (around 4/30/2018). lab results and schedule of future lab studies reviewed with patient  reviewed diet, exercise and weight control  reviewed medications and side effects in detail  F/u with other MD's as scheduled  Advised patient to sleep on his back or belly if possible. Also can use a pillow between knees. Advised using Vaseline to the irritated area.

## 2018-01-01 ENCOUNTER — OFFICE VISIT (OUTPATIENT)
Dept: INTERNAL MEDICINE CLINIC | Age: 63
End: 2018-01-01

## 2018-01-01 ENCOUNTER — OFFICE VISIT (OUTPATIENT)
Dept: CARDIOLOGY CLINIC | Age: 63
End: 2018-01-01

## 2018-01-01 VITALS
BODY MASS INDEX: 24.58 KG/M2 | OXYGEN SATURATION: 97 % | SYSTOLIC BLOOD PRESSURE: 134 MMHG | HEART RATE: 103 BPM | HEIGHT: 68 IN | TEMPERATURE: 97.9 F | RESPIRATION RATE: 18 BRPM | DIASTOLIC BLOOD PRESSURE: 90 MMHG | WEIGHT: 162.2 LBS

## 2018-01-01 VITALS
SYSTOLIC BLOOD PRESSURE: 128 MMHG | WEIGHT: 154 LBS | HEART RATE: 86 BPM | BODY MASS INDEX: 23.34 KG/M2 | HEIGHT: 68 IN | DIASTOLIC BLOOD PRESSURE: 82 MMHG

## 2018-01-01 VITALS
DIASTOLIC BLOOD PRESSURE: 65 MMHG | HEIGHT: 68 IN | HEART RATE: 98 BPM | WEIGHT: 154 LBS | SYSTOLIC BLOOD PRESSURE: 92 MMHG | OXYGEN SATURATION: 98 % | RESPIRATION RATE: 19 BRPM | BODY MASS INDEX: 23.34 KG/M2

## 2018-01-01 DIAGNOSIS — I95.1 ORTHOSTATIC HYPOTENSION: ICD-10-CM

## 2018-01-01 DIAGNOSIS — G89.29 CHRONIC LEFT SHOULDER PAIN: Primary | ICD-10-CM

## 2018-01-01 DIAGNOSIS — F20.0 PARANOID SCHIZOPHRENIA (HCC): ICD-10-CM

## 2018-01-01 DIAGNOSIS — M25.512 CHRONIC LEFT SHOULDER PAIN: ICD-10-CM

## 2018-01-01 DIAGNOSIS — L25.9 CONTACT DERMATITIS OF EXTERNAL EAR: ICD-10-CM

## 2018-01-01 DIAGNOSIS — M79.652 LEFT THIGH PAIN: ICD-10-CM

## 2018-01-01 DIAGNOSIS — R42 ORTHOSTATIC DIZZINESS: Primary | ICD-10-CM

## 2018-01-01 DIAGNOSIS — M25.512 CHRONIC LEFT SHOULDER PAIN: Primary | ICD-10-CM
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
## 2018-01-01 DIAGNOSIS — G89.29 CHRONIC LEFT SHOULDER PAIN: ICD-10-CM

## 2018-01-01 DIAGNOSIS — J30.2 SEASONAL ALLERGIC RHINITIS, UNSPECIFIED TRIGGER: Primary | ICD-10-CM

## 2018-01-01 RX ORDER — GUAIFENESIN 600 MG/1
600 TABLET, EXTENDED RELEASE ORAL 2 TIMES DAILY
Qty: 14 TAB | Refills: 0 | Status: SHIPPED | OUTPATIENT
Start: 2018-01-01

## 2018-01-01 RX ORDER — FLUTICASONE PROPIONATE 50 MCG
2 SPRAY, SUSPENSION (ML) NASAL DAILY
Qty: 1 BOTTLE | Refills: 0 | Status: SHIPPED | OUTPATIENT
Start: 2018-01-01

## 2018-01-01 RX ORDER — TRIAMCINOLONE ACETONIDE 1 MG/G
CREAM TOPICAL 2 TIMES DAILY
Qty: 15 G | Refills: 0 | Status: SHIPPED | OUTPATIENT
Start: 2018-01-01

## 2018-01-01 RX ORDER — OLANZAPINE 10 MG/1
10 TABLET ORAL
COMMUNITY

## 2018-01-01 RX ORDER — FLUDROCORTISONE ACETATE 0.1 MG/1
TABLET ORAL
Qty: 30 TAB | Refills: 12 | Status: SHIPPED | OUTPATIENT
Start: 2018-01-01

## 2018-04-30 PROBLEM — M25.512 CHRONIC LEFT SHOULDER PAIN: Status: ACTIVE | Noted: 2018-01-01

## 2018-04-30 PROBLEM — G89.29 CHRONIC LEFT SHOULDER PAIN: Status: ACTIVE | Noted: 2018-01-01

## 2018-04-30 NOTE — MR AVS SNAPSHOT
[address] [facility] [address] [place] [address] 
[phone] Patient: Kori Cardoza MRN: [id number] Visit Information Date & Time Provider Department Dept. Phone Encounter #  
 4/30/2018  2:30 PM Jeet Abreu [facility] Internal Medicine [phone] [id number] Follow-up Instructions Return in about 6 months (around 10/30/2018). Your Appointments 9/13/2018  1:00 PM  
ESTABLISHED PATIENT with Pily Pinto MD  
[facility] ([facility] Appt Note: 1 year fu  
 [address] [place] [address]  
[address] [address] Upcoming Health Maintenance Date Due Hepatitis C Screening 1955 DTaP/Tdap/Td series (1 - Tdap) 3/4/1975 FOBT Q 1 YEAR AGE 50-75 3/4/2004 ZOSTER VACCINE AGE 60> 1/4/2014 Influenza Age 5 to Adult 8/1/2018 Allergies as of 4/30/2018  Review Complete On: 4/30/2018 By: Nik Purdy, DO No Known Allergies Current Immunizations  Reviewed on 10/18/2016 No immunizations on file. Not reviewed this visit You Were Diagnosed With   
  
 Codes Comments Chronic left shoulder pain    -  Primary ICD-10-CM: M25.512, U29.97 ICD-9-CM: 719.41, 338.29 Orthostatic hypotension     ICD-10-CM: I95.1 ICD-9-CM: 458.0 Left thigh pain     ICD-10-CM: T47.725 ICD-9-CM: 729.5 Fall in home, initial encounter     ICD-10-CM: W19. Pily Spencer, Y92.009 ICD-9-CM: E888.9, E849.0 Paranoid schizophrenia ([facility] [address]     ICD-10-CM: F20.0 ICD-9-CM: 295.30 Vitals BP Pulse Resp Height(growth percentile) Weight(growth percentile) SpO2  
 92/65 (BP 1 Location: Right arm, BP Patient Position: Sitting) 98 19 5' 8\" (1.727 m) 154 lb (69.9 kg) 98% BMI Smoking Status 23.42 kg/m2 Never Smoker Vitals History BMI and BSA Data Body Mass Index Body Surface Area 23.42 kg/m 2 1.83 m 2 Preferred Pharmacy Pharmacy Name Phone [address], [address] [phone] Your Updated Medication List  
  
   
This list is accurate as of 4/30/18  3:03 PM.  Always use your most recent med list.  
  
  
  
  
 clonazePAM 2 mg tablet Commonly known as:  Taylor Georgis Take 2 mg by mouth nightly. cloZAPine 25 mg tablet Commonly known as:  CLOZARIL Take 75 mg by mouth daily. fludrocortisone 0.1 mg tablet Commonly known as:  FLORINEF Take 1 Tab by mouth daily. Indications: Symptomatic Orthostatic Hypotension  
  
 fluvoxaMINE 50 mg tablet Commonly known as:  Elsi Khat Take 50 mg by mouth nightly. VOLTAREN PO Take 75 mg by mouth. We Performed the Following DRAIN/INJECT LARGE JOINT/BURSA [id number] CPT(R)] METHYLPREDNISOLONE ACETATE INJECTION 40 MG [ [facility]] Follow-up Instructions Return in about 6 months (around 10/30/2018). Please provide this summary of care documentation to your next provider. Your primary care clinician is listed as Amanda Elena. If you have any questions after today's visit, please call [phone].

## 2018-04-30 NOTE — PATIENT INSTRUCTIONS
Joint Injections: Care Instructions  Your Care Instructions  Joint injections are shots into a joint, such as the knee. They may be used to put in medicines, such as pain relievers. Or they can be used to take out fluid. Sometimes the fluid is tested in a lab. This can help find the cause of a joint problem. A corticosteroid, or steroid, shot is used to reduce inflammation in tendons or joints. It is often used to treat problems such as arthritis, tendinitis, and bursitis. Steroids can be injected directly into a painful, inflamed joint. They can also help reduce inflammation of a bursa. A bursa is a sac of fluid. It cushions and lubricates areas where tendons, ligaments, skin, muscles, or bones rub against each other. A steroid shot can sometimes help with short-term pain relief when other treatments haven't worked. If steroid shots help, pain may improve for weeks or months. Follow-up care is a key part of your treatment and safety. Be sure to make and go to all appointments, and call your doctor if you are having problems. It's also a good idea to know your test results and keep a list of the medicines you take. How can you care for yourself at home? · Put ice or a cold pack on the area for 10 to 20 minutes at a time. Put a thin cloth between the ice and your skin. · Take anti-inflammatory medicines to reduce pain, swelling, or inflammation. These include ibuprofen (Advil, Motrin) and naproxen (Aleve). Read and follow all instructions on the label. · Avoid strenuous activities for several days, especially those that put stress on the area where you got the shot. · If you have dressings over the area, keep them clean and dry. You may remove them when your doctor tells you to. When should you call for help? Call your doctor now or seek immediate medical care if:  ? · You have signs of infection, such as:  ¨ Increased pain, swelling, warmth, or redness. ¨ Red streaks leading from the site.   ¨ Pus draining from the site. ¨ A fever. ? Watch closely for changes in your health, and be sure to contact your doctor if you have any problems. Where can you learn more? Go to http://salbador-octavia.info/. Enter N616 in the search box to learn more about \"Joint Injections: Care Instructions. \"  Current as of: March 21, 2017  Content Version: 11.4  © 7587-1671 [employer]. Care instructions adapted under license by [employer] (which disclaims liability or warranty for this information). If you have questions about a medical condition or this instruction, always ask your healthcare professional. [address] any warranty or liability for your use of this information.

## 2018-04-30 NOTE — PROGRESS NOTES
Health Maintenance Due   Topic Date Due    Hepatitis C Screening  1955    DTaP/Tdap/Td series (1 - Tdap) 03/04/1975    FOBT Q 1 YEAR AGE 50-75  03/04/2004    ZOSTER VACCINE AGE 60>  01/04/2014       Chief Complaint   Patient presents with    Shoulder Pain     left     Leg Pain     left     Follow Up Chronic Condition       1. Have you been to the ER, urgent care clinic since your last visit? Hospitalized since your last visit? No    2. Have you seen or consulted any other health care providers outside of the [facility] since your last visit? Include any pap smears or colon screening. No    3) Do you have an Advance Directive on file? no    4) Are you interested in receiving information on Advance Directives? NO      Patient is accompanied by self I have received verbal consent from [facility] to discuss any/all medical information while they are present in the room.     [place] [facility]  OFFICE PROCEDURE PROGRESS NOTE        Chart reviewed for the following:   IMalena LPN, have reviewed the History, Physical and updated the Allergic reactions for [facility] performed immediately prior to start of procedure:   Massimo Cerna LPN, have performed the following reviews on [facility] prior to the start of the procedure:            * Patient was identified by name and date of birth   * Agreement on procedure being performed was verified  * Risks and Benefits explained to the patient  * Procedure site verified and marked as necessary  * Patient was positioned for comfort  * Consent was signed and verified     Time: 3:30pm      Date of procedure: 4/30/2018    Procedure performed by:  Karissa Peralta DO    Provider assisted by: Angely Armenta LPN    Patient assisted by: self    How tolerated by patient: tolerated the procedure well with no complications    Post Procedural Pain Scale: 2 - Hurts Little Bit    Comments: none

## 2018-05-28 NOTE — PROGRESS NOTES
HISTORY OF PRESENT ILLNESS  Ruel Jean Baptiste is a 58 y.o. male. Pt. comes in for f/u. Has multiple medical problems. Reports recent fall at home. Hurt L thigh but better now. His chronic L shoulder pain is worse. Asking for a shot. Followed by cardiology for orthostatic BP. Denies related symptoms. Followed by psych for schizophrenia. Reports compliance with medications and diet. Med list and most recent labs/studies reviewed with pt. Trying to be active physically to control weight. Reports no other new c/o. Shoulder Pain      Leg Pain    Pertinent negatives include no back pain. Follow Up Chronic Condition   Pertinent negatives include no chest pain, no abdominal pain, no headaches and no shortness of breath. Fall   Pertinent negatives include no abdominal pain and no headaches. Review of Systems   Constitutional: Negative. Eyes: Negative for blurred vision. Respiratory: Negative for shortness of breath. Cardiovascular: Negative for chest pain and leg swelling. Gastrointestinal: Negative for abdominal pain. Genitourinary: Negative for dysuria. Musculoskeletal: Positive for falls and joint pain. Negative for back pain. Skin: Negative. Neurological: Negative for dizziness, sensory change, focal weakness and headaches. Psychiatric/Behavioral: Positive for depression and hallucinations. The patient is not nervous/anxious and does not have insomnia. All other systems reviewed and are negative. Physical Exam   Constitutional: He is oriented to person, place, and time. He appears well-developed and well-nourished. No distress. HENT:   Head: Normocephalic and atraumatic. Mouth/Throat: Oropharynx is clear and moist.   Eyes: Conjunctivae are normal. No scleral icterus. Neck: Normal range of motion. Neck supple. No JVD present. No thyromegaly present. Cardiovascular: Normal rate, regular rhythm and normal heart sounds.     Pulmonary/Chest: Effort normal and breath sounds normal. No respiratory distress. He has no wheezes. He has no rales. Abdominal: Soft. Bowel sounds are normal. He exhibits no distension. Musculoskeletal: He exhibits tenderness (L shoulder with dec. ROM). He exhibits no edema. Neurological: He is alert and oriented to person, place, and time. He has normal reflexes. Coordination normal.   Skin: Skin is warm and dry. No rash noted. Psychiatric: He has a normal mood and affect. His behavior is normal.   Nursing note and vitals reviewed. ASSESSMENT and PLAN  Diagnoses and all orders for this visit:    1. Chronic left shoulder pain  -     DRAIN/INJECT LARGE JOINT/BURSA  -     METHYLPREDNISOLONE ACETATE INJECTION 40 MG    2. Orthostatic hypotension    3. Left thigh pain    4. Fall in home, initial encounter    5. Paranoid schizophrenia ([facility] [address]      Follow-up Disposition:  Return in about 6 months (around 10/30/2018).    lab results and schedule of future lab studies reviewed with patient  reviewed diet, exercise and weight control  reviewed medications and side effects in detail  F/u with other MD's as scheduled  Overall stable

## 2018-09-13 NOTE — MR AVS SNAPSHOT
[address] [facility] [address] [facility] 
[phone] Patient: Ana Gonzales MRN: [id number] KNX:[birth year] Visit Information Date & Time Provider Department Dept. Phone Encounter #  
 9/13/2018  1:00 PM Danay Fonseca MD [facility] Sushil Rendon [phone] [id number] Your Appointments 10/30/2018  2:45 PM  
ROUTINE CARE with Violet Myles DO [facility] ([address]) Appt Note: 6 mos f/u  
 [address] [facility] [address]  
[phone]  
  
   
 [address] [facility] [address] Upcoming Health Maintenance Date Due Hepatitis C Screening 1955 DTaP/Tdap/Td series (1 - Tdap) 3/4/1975 FOBT Q 1 YEAR AGE 50-75 3/4/2004 ZOSTER VACCINE AGE 60> 1/4/2014 Influenza Age 5 to Adult 8/1/2018 Allergies as of 9/13/2018  Review Complete On: 9/13/2018 By: Danay Fonscea MD  
 No Known Allergies Current Immunizations  Reviewed on 10/18/2016 No immunizations on file. Not reviewed this visit You Were Diagnosed With   
  
 Codes Comments Orthostatic dizziness    -  Primary ICD-10-CM: V53 ICD-9-CM: 780.4 Orthostatic hypotension     ICD-10-CM: I95.1 ICD-9-CM: 458.0 Dizziness     ICD-10-CM: F48 ICD-9-CM: 780.4 Vitals BP Pulse Height(growth percentile) Weight(growth percentile) BMI Smoking Status 128/82 (BP 1 Location: Right arm, BP Patient Position: Sitting) 86 5' 8\" (1.727 m) 154 lb (69.9 kg) 23.42 kg/m2 Never Smoker Vitals History BMI and BSA Data Body Mass Index Body Surface Area  
 23.42 kg/m 2 1.83 m 2 Preferred Pharmacy Pharmacy Name Phone [address] [phone] Your Updated Medication List  
  
   
This list is accurate as of 9/13/18  1:27 PM.  Always use your most recent med list.  
  
  
  
  
 clonazePAM 2 mg tablet Commonly known as:  Laveronica Ayush Take 2 mg by mouth nightly. cloZAPine 25 mg tablet Commonly known as:  CLOZARIL Take 75 mg by mouth daily. fludrocortisone 0.1 mg tablet Commonly known as:  FLORINEF Take 1 Tab by mouth daily. Indications: Symptomatic Orthostatic Hypotension  
  
 fluvoxaMINE 50 mg tablet Commonly known as:  Carisa Matters Take 50 mg by mouth nightly. OLANZapine 10 mg tablet Commonly known as:  ZyPREXA Take 10 mg by mouth nightly. VOLTAREN PO Take 75 mg by mouth. Patient Instructions You will need to follow up in clinic with Dr. Emre Cho in 12 months. Introducing [facility]! [employer] introduces hdl therapeutics patient portal. Now you can access parts of your medical record, email your doctor's office, and request medication refills online. 1. In your internet browser, go to [email or web address] 2. Click on the First Time User? Click Here link in the Sign In box. You will see the New Member Sign Up page. 3. Enter your hdl therapeutics Access Code exactly as it appears below. You will not need to use this code after youve completed the sign-up process. If you do not sign up before the expiration date, you must request a new code. · hdl therapeutics Access Code: [email or web address] Expires: 12/12/2018  1:27 PM 
 
4. Enter the last four digits of your Social Security Number (xxxx) and Date of Birth (mm/dd/yyyy) as indicated and click Submit. You will be taken to the next sign-up page. 5. Create a Funiumt ID. This will be your hdl therapeutics login ID and cannot be changed, so think of one that is secure and easy to remember. 6. Create a Funiumt password. You can change your password at any time. 7. Enter your Password Reset Question and Answer. This can be used at a later time if you forget your password. 8. Enter your e-mail address. You will receive e-mail notification when new information is available in [address] 9. Click Sign Up. You can now view and download portions of your medical record. 10. Click the Download Summary menu link to download a portable copy of your medical information. If you have questions, please visit the Frequently Asked Questions section of the StyleTread website. Remember, StyleTread is NOT to be used for urgent needs. For medical emergencies, dial 911. Now available from your iPhone and Android! Please provide this summary of care documentation to your next provider. Your primary care clinician is listed as Marimar Buenrostro. If you have any questions after today's visit, please call [phone].

## 2018-09-13 NOTE — PROGRESS NOTES
Cardiac Electrophysiology Office Note     Subjective:      Ursula Madera is a 58 y.o. male patient who is seen for follow up of orthostatic hypotension/dizziness. BP improved with Florinef, & he reports no further dizziness or syncope. He was encouraged to drink 64 oz water daily. Occasional lightheadedness/dizziness when standing up too quickly, but states this is rare. He continues with Florinef, also maintaining hydration status. He denies chest pain, palpitations, SOB, PND, orthopnea, syncope, or edema. In the past:  He denies ever experiencing syncope, has felt lightheaded/dizzy in the middle of the night when getting up to go to bathroom, states had to grab the sink, went back to bed, woke up with dizziness. Echo (10/12/2016): LVEF 55%, mild LVH. No RWMA. Referred by Dr. Janie Dakin. Antivert helped some    He has been on antipsychotic for 25 years and it helped his psychosis       Patient Active Problem List    Diagnosis Date Noted    Chronic left shoulder pain 04/30/2018    Orthostatic hypotension 10/18/2016    Orthostatic dizziness 10/07/2016    Arthralgia of both knees 07/28/2015    Dry eyes 01/27/2015    AR (allergic rhinitis) 01/27/2015    Finger numbness 09/02/2014    Constipation 11/22/2010    Schizophrenia (HCC) 11/22/2010     Current Outpatient Prescriptions   Medication Sig Dispense Refill    OLANZapine (ZYPREXA) 10 mg tablet Take 10 mg by mouth nightly.  fludrocortisone (FLORINEF) 0.1 mg tablet Take 1 Tab by mouth daily. Indications: Symptomatic Orthostatic Hypotension 30 Tab 12    cloZAPine (CLOZARIL) 25 mg tablet Take 75 mg by mouth daily.  DICLOFENAC SODIUM (VOLTAREN PO) Take 75 mg by mouth.  fluvoxaMINE (LUVOX) 50 mg tablet Take 50 mg by mouth nightly.  clonazepam (KLONOPIN) 2 mg tablet Take 2 mg by mouth nightly.        No Known Allergies  Past Medical History:   Diagnosis Date    Chronic left shoulder pain 4/30/2018    Psychotic disorder      No past surgical history    Family History   Problem Relation Age of Onset    No Known Problems Mother     No Known Problems Father      Social History   Substance Use Topics    Smoking status: Never Smoker    Smokeless tobacco: Never Used    Alcohol use No        Review of Systems:   Constitutional: Negative for fever, chills, weight loss, malaise/fatigue. HEENT: Negative for nosebleeds, vision changes. Respiratory: Negative for cough, hemoptysis, sputum production, and wheezing. Cardiovascular: Negative for chest pain, palpitations, orthopnea, claudication, leg swelling, syncope, and PND. Gastrointestinal: Negative for nausea, vomiting, diarrhea, constipation, blood in stool and melena. Genitourinary: Negative for dysuria, and hematuria. Musculoskeletal: Negative for myalgias, arthralgia. Skin: Negative for rash. Heme: Does not bleed or bruise easily. Neurological: Negative for speech change and focal weakness     Objective:     Visit Vitals    /82 (BP 1 Location: Right arm, BP Patient Position: Sitting)    Pulse 86    Ht 5' 8\" (1.727 m)    Wt 154 lb (69.9 kg)    BMI 23.42 kg/m2        Physical Exam:   Constitutional: Well-nourished. No distress. Head: Normocephalic and atraumatic. Eyes: Pupils are equal, round  Neck: supple. No JVD present. Cardiovascular: normal rate, regular rhythm. Exam reveals no gallop and no friction rub. No murmur heard. Pulmonary/Chest: Effort normal and breath sounds normal. No wheezes. Abdominal: Soft, no tenderness. Musculoskeletal: no edema. Neurological: alert,oriented. Skin: Skin is warm and dry  Psychiatric: normal mood and affect. Behavior is normal. Judgment and thought content normal.      Assessment/Plan:       ICD-10-CM ICD-9-CM    1. Orthostatic dizziness R42 780.4    2. Orthostatic hypotension I95.1 458.0    3. Paranoid schizophrenia ([facility] [address] F20.0 295.30      Mr. Navarro continues to do well with [facility].   He was encouraged to continue hydration as well. No hypotension noted today, & dizziness is rare. He will continue with florinef    Follow up in EP clinic in 1 year. He reports that he sees his PCP every 6 months. Future Appointments  Date Time Provider Matt Libertad   10/30/2018 2:45 PM Alexandra Mclean DO [facility]   9/10/2019 2:00 PM Whitney Ortiz  [address]     Thank you for involving me in this patient's care and please call with further concerns or questions. Wolfgang Merlin, M.D.   Electrophysiology/Cardiology  [facility] [place]  [facility], [address], [facility] [address]  [address]  [phone]

## 2018-09-13 NOTE — PROGRESS NOTES
Cardiac Electrophysiology Office Note Subjective:  
  
Bethel Guerrero is a 58 y.o. male patient who is seen for follow up of orthostatic hypotension/dizziness. BP improved with Florinef, & he reports no further dizziness or syncope. He was encouraged to drink 64 oz water daily. Occasional lightheadedness/dizziness when standing up too quickly, but states this is rare. He continues with Florinef, also maintaining hydration status. He denies chest pain, palpitations, SOB, PND, orthopnea, syncope, or edema. In the past: 
He denies ever experiencing syncope, has felt lightheaded/dizzy in the middle of the night when getting up to go to bathroom, states had to grab the sink, went back to bed, woke up with dizziness. Echo (10/12/2016): LVEF 55%, mild LVH. No RWMA. Referred by Dr. Ladarius Greenberg. Antivert helped some He has been on antipsychotic for 25 years and it helped his psychosis Patient Active Problem List  
 Diagnosis Date Noted  Chronic left shoulder pain 04/30/2018  Orthostatic hypotension 10/18/2016  Orthostatic dizziness 10/07/2016  Arthralgia of both knees 07/28/2015  Dry eyes 01/27/2015  AR (allergic rhinitis) 01/27/2015  Finger numbness 09/02/2014  Constipation 11/22/2010  Schizophrenia ([facility] [address] 11/22/2010 Current Outpatient Prescriptions Medication Sig Dispense Refill  OLANZapine (ZYPREXA) 10 mg tablet Take 10 mg by mouth nightly.  fludrocortisone (FLORINEF) 0.1 mg tablet Take 1 Tab by mouth daily. Indications: Symptomatic Orthostatic Hypotension 30 Tab 12  
 cloZAPine (CLOZARIL) 25 mg tablet Take 75 mg by mouth daily.  DICLOFENAC SODIUM (VOLTAREN PO) Take 75 mg by mouth.  fluvoxaMINE (LUVOX) 50 mg tablet Take 50 mg by mouth nightly.  clonazepam (KLONOPIN) 2 mg tablet Take 2 mg by mouth nightly. No Known Allergies Past Medical History:  
Diagnosis Date  Chronic left shoulder pain 4/30/2018  Psychotic disorder No past surgical history Family History Problem Relation Age of Onset  No Known Problems Mother  No Known Problems Father Social History Substance Use Topics  Smoking status: Never Smoker  Smokeless tobacco: Never Used  Alcohol use No  
  
 
Review of Systems:  
Constitutional: Negative for fever, chills, weight loss, malaise/fatigue. HEENT: Negative for nosebleeds, vision changes. Respiratory: Negative for cough, hemoptysis, sputum production, and wheezing. Cardiovascular: Negative for chest pain, palpitations, orthopnea, claudication, leg swelling, syncope, and PND. Gastrointestinal: Negative for nausea, vomiting, diarrhea, constipation, blood in stool and melena. Genitourinary: Negative for dysuria, and hematuria. Musculoskeletal: Negative for myalgias, arthralgia. Skin: Negative for rash. Heme: Does not bleed or bruise easily. Neurological: Negative for speech change and focal weakness Objective:  
 
Visit Vitals  /82 (BP 1 Location: Right arm, BP Patient Position: Sitting)  Pulse 86  Ht 5' 8\" (1.727 m)  Wt 154 lb (69.9 kg)  BMI 23.42 kg/m2 Physical Exam:  
Constitutional: Well-nourished. No distress. Head: Normocephalic and atraumatic. Eyes: Pupils are equal, round Neck: supple. No JVD present. Cardiovascular: normal rate, regular rhythm. Exam reveals no gallop and no friction rub. No murmur heard. Pulmonary/Chest: Effort normal and breath sounds normal. No wheezes. Abdominal: Soft, no tenderness. Musculoskeletal: no edema. Neurological: alert,oriented. Skin: Skin is warm and dry Psychiatric: normal mood and affect. Behavior is normal. Judgment and thought content normal.   
 
Assessment/Plan: ICD-10-CM ICD-9-CM 1. Orthostatic dizziness R42 780.4 2. Orthostatic hypotension I95.1 458.0 3. Dizziness R42 780.4 Mr. Jamaica Johnson continues to do well with Florinef. He was encouraged to continue hydration as well. No hypotension noted today, & dizziness is rare. Follow up in EP clinic in 1 year. He reports that he sees his PCP every 6 months. Future Appointments Date Time Provider Matt Maurer 10/30/2018 2:45 PM Gunjan Vann,  [address] [facility] Thank you for involving me in this patient's care and please call with further concerns or questions. Erich Cardenas M.D. Electrophysiology/Cardiology [facility] [facility] [place] [facility] 84, [facility] [address] [place] [address], [address] [place] [address] 
[phone]

## 2018-09-13 NOTE — PROGRESS NOTES
Chief Complaint   Patient presents with    Other     orthostatic hypotension     Annual Exam     Verified patient with two types of identifiers. Verified medications with the patient.     Verified patient's pharmacy

## 2018-10-25 NOTE — TELEPHONE ENCOUNTER
Request for Florinef 0.1 mg daily . Last office visit 9/13/18, next office visit 9/10/19. Refills per verbal order from Dr. Sebas Garcia.

## 2018-10-30 NOTE — PROGRESS NOTES
[facility] INTERNAL MEDICINE  OFFICE PROCEDURE PROGRESS NOTE        Chart reviewed for the following:   Rajat TENORIO LPN, have reviewed the History, Physical and updated the Allergic reactions for Jimbo Sloan performed immediately prior to start of procedure:   Eduardo Ayoub LPN, have performed the following reviews on Coletta Dakin prior to the start of the procedure:            * Patient was identified by name and date of birth   * Agreement on procedure being performed was verified  * Risks and Benefits explained to the patient  * Procedure site verified and marked as necessary  * Patient was positioned for comfort  * Consent was signed and verified     Time: 3:28pm      Date of procedure: 10/30/2018    Procedure performed by:  Arcenio Lacey DO    Provider assisted by: Keena Hartley LPN    Patient assisted by: self    How tolerated by patient: tolerated the procedure well with no complications    Post Procedural Pain Scale: 2 - Hurts Little Bit    Comments: none

## 2018-10-30 NOTE — PROGRESS NOTES
Health Maintenance Due   Topic Date Due    Hepatitis C Screening  1955    DTaP/Tdap/Td series (1 - Tdap) 03/04/1975    Shingrix Vaccine Age 50> (1 of 2) 03/04/2004    FOBT Q 1 YEAR AGE 50-75  03/04/2004    Influenza Age 9 to Adult  08/01/2018    MEDICARE YEARLY EXAM  10/29/2018       Chief Complaint   Patient presents with    Cold     6 mos F/U    Shoulder Pain    Dry Skin     Ear left side       1. Have you been to the ER, urgent care clinic since your last visit? Hospitalized since your last visit? No    2. Have you seen or consulted any other health care providers outside of the [address] [place] [address] since your last visit? Include any pap smears or colon screening. No    3) Do you have an Advance Directive on file? no    4) Are you interested in receiving information on Advance Directives? NO      Patient is accompanied by self I have received verbal consent from Jaime Jerez to discuss any/all medical information while they are present in the room. Patient offered flu shot denied.

## 2018-10-30 NOTE — PATIENT INSTRUCTIONS
Joint Injections: Care Instructions  Your Care Instructions    Joint injections are shots into a joint, such as the knee. They may be used to put in medicines, such as pain relievers. A corticosteroid, or steroid, shot is used to reduce inflammation in tendons or joints. It is often used to treat problems such as arthritis, tendinitis, and bursitis. Steroids can be injected directly into a painful, inflamed joint. They can also help reduce inflammation of a bursa. A bursa is a sac of fluid. It cushions and lubricates areas where tendons, ligaments, skin, muscles, or bones rub against each other. A steroid shot can sometimes help with short-term pain relief when other treatments haven't worked. If steroid shots help, pain may improve for weeks or months. Follow-up care is a key part of your treatment and safety. Be sure to make and go to all appointments, and call your doctor if you are having problems. It's also a good idea to know your test results and keep a list of the medicines you take. How can you care for yourself at home? · Put ice or a cold pack on the area for 10 to 20 minutes at a time. Put a thin cloth between the ice and your skin. · Ask your doctor if you can take an over-the-counter pain medicine, such as acetaminophen (Tylenol), ibuprofen (Advil, Motrin), or naproxen (Aleve). Be safe with medicines. Read and follow all instructions on the label. · Avoid strenuous activities for several days. In particular, avoid ones that put stress on the area where you got the shot. · If you have dressings over the area, keep them clean and dry. You may remove them when your doctor tells you to. When should you call for help? Call your doctor now or seek immediate medical care if:    · You have signs of infection, such as:  ? Increased pain, swelling, warmth, or redness. ? Red streaks leading from the site. ? Pus draining from the site.   ? A fever.    Watch closely for changes in your health, and be sure to contact your doctor if you have any problems. Where can you learn more? Go to http://salabdor-octavia.info/. Enter N616 in the search box to learn more about \"Joint Injections: Care Instructions. \"  Current as of: November 29, 2017  Content Version: 11.8  © 5922-2213 [employer]. Care instructions adapted under license by [email or web address] (which disclaims liability or warranty for this information). If you have questions about a medical condition or this instruction, always ask your healthcare professional. [address] any warranty or liability for your use of this information.

## 2018-10-31 NOTE — PROGRESS NOTES
HISTORY OF PRESENT ILLNESS  Dorita Crenshaw is a 58 y.o. male. He is back for follow-up. Has a few chronic medical issues. Reports a few days of cold symptoms with a congested productive cough. OTC meds are not helping. Also has recurrent left shoulder pain. Cortisone shot in the past help. Also reports an area of irritation on the left earlobe. Is not going away. Followed by psychiatrist for schizophrenia. Followed by cardiologist for orthostatic hypotension. Med list and most recent studies reviewed with patient. Reports compliance with medications. Denies tobacco or alcohol use. No other new complaints. HPI    Review of Systems   Constitutional: Negative. HENT: Positive for congestion. Eyes: Negative for blurred vision. Respiratory: Positive for cough and sputum production. Negative for shortness of breath. Cardiovascular: Negative for chest pain and leg swelling. Gastrointestinal: Negative for abdominal pain. Genitourinary: Negative for dysuria. Musculoskeletal: Positive for joint pain. Negative for back pain and falls. Skin: Negative. Neurological: Negative for dizziness, sensory change, focal weakness and headaches. Psychiatric/Behavioral: Positive for depression and hallucinations. The patient is not nervous/anxious and does not have insomnia. All other systems reviewed and are negative. Physical Exam   Constitutional: He is oriented to person, place, and time. He appears well-developed and well-nourished. No distress. HENT:   Head: Normocephalic and atraumatic. Mouth/Throat: Oropharynx is clear and moist. No oropharyngeal exudate. Erythema with PND   Eyes: Conjunctivae are normal. No scleral icterus. Neck: Normal range of motion. Neck supple. No JVD present. No thyromegaly present. Cardiovascular: Normal rate, regular rhythm and normal heart sounds. Pulmonary/Chest: Effort normal and breath sounds normal. No respiratory distress. He has no wheezes.  He has no rales. Abdominal: Soft. Bowel sounds are normal. He exhibits no distension. Musculoskeletal: He exhibits tenderness (L shoulder with dec. ROM). He exhibits no edema. Neurological: He is alert and oriented to person, place, and time. He has normal reflexes. Coordination normal.   Skin: Skin is warm and dry. No rash noted. Psychiatric: He has a normal mood and affect. His behavior is normal.   Nursing note and vitals reviewed. ASSESSMENT and PLAN  Diagnoses and all orders for this visit:    1. Seasonal allergic rhinitis, unspecified trigger    2. Chronic left shoulder pain  -     DRAIN/INJECT LARGE JOINT/BURSA  -     METHYLPREDNISOLONE ACETATE INJECTION 40 MG    3. Orthostatic hypotension    4. Contact dermatitis of external ear    Other orders  -     triamcinolone acetonide (KENALOG) 0.1 % topical cream; Apply  to affected area two (2) times a day. use thin layer  -     fluticasone (FLONASE) 50 mcg/actuation nasal spray; 2 Sprays by Both Nostrils route daily.  -     guaiFENesin ER (MUCINEX) 600 mg ER tablet; Take 1 Tab by mouth two (2) times a day. Follow-up Disposition:  Return in about 6 months (around 4/30/2019).    lab results and schedule of future lab studies reviewed with patient  reviewed diet, exercise and weight control  reviewed medications and side effects in detail  F/u with other MD's as scheduled  Shoulder exercises shown to patient  Tylenol or Advil as needed pain

## 2019-04-22 ENCOUNTER — TELEPHONE (OUTPATIENT)
Dept: CARDIOLOGY CLINIC | Age: 64
End: 2019-04-22

## 2019-04-22 NOTE — TELEPHONE ENCOUNTER
Spoke with patient's sister and was informed that Dr. Daisy Jensen has signed the death certificate. Notified [facility] to call our office if they needed anything from our office.

## 2019-04-22 NOTE — TELEPHONE ENCOUNTER
Patient's sister calling to request if Dr Mala Knight could sign the death certificate for her brother. She states his PCP has refused because he has not seen the patient in over 6 months.     She can be reached at  [phone]